# Patient Record
(demographics unavailable — no encounter records)

---

## 2024-10-09 NOTE — ASSESSMENT
[FreeTextEntry1] : Mr. CRUZ presents today for his postoperative follow up appointment; he is status post Transcatheter Aortic Valve Replacement.   Physical Exam was benign with well healing incisions. MCOT review was unremarkable.   During the visit, we discussed the importance of increasing activity and exercise as tolerated. We discussed the need for antibiotic prophylaxis with procedures such as dental work and colonoscopy or endoscopy. We recommend waiting a full six months before undergoing any dental procedure or cleaning. The patient should maintain the MCOT device as instructed for a total of 30 days.   Overall, I am pleased with Mr. CRUZ's progress postoperatively. I am recommending that he continue follow up care with Cardiology and Primary Care Provider; he will return to care in office as needed. All questions answered, patient verbalizes understanding.   Some redness was noted in his groin, he is placing interdry to both groins to help prevent.    PLAN: - Follow up Cardiology, Dr. Chappell on Friday 10/11/24 - CBC/BMP 30 Day Follow Up - Transthoracic Echocardiogram 30 Day Follow Up - EKG 30 Day Follow Up - Maintain MCOT

## 2024-10-09 NOTE — PROCEDURE
[FreeTextEntry1] : Post operative Transthoracic Echocardiogram 9/27/24  1. Left ventricular systolic function is normal with an ejection fraction of 61 % by Martinez's method of disks with an ejection fraction visually estimated at 60 to 65 %.  2. Mild left ventricular hypertrophy.  3. Analysis of left ventricular diastolic function and filling pressure is made challenging by the presence of atrial fibrillation.  4. Mildly enlarged right ventricular cavity size and normal right ventricular systolic function.  5. Estimated pulmonary artery systolic pressure is 63 mmHg, consistent with moderate-to-severe pulmonary hypertension.  6. Left atrium is severely dilated.  7. The right atrium is severely dilated.  8. Moderate to severe tricuspid regurgitation.  9. Mild to moderate mitral regurgitation. 10. CoreValve TAVR at the aortic position, minimal/trace paravalvular regugitation by spectral Doppler, mean gradient of 7 mmHg and dimensionless index of 0.45 11. No pericardial effusion seen. 12. Compared to the transthoracic echocardiogram performed on 9/6/24 increased in severity of tricuspid regurgitation and estimated PASP.

## 2024-10-09 NOTE — REASON FOR VISIT
[de-identified] : Transfemoral Valve in Valve TAVR via Right Common Femoral Artery- 29mm CoreValve Evolut FX+ [de-identified] : 9/27/2024 [de-identified] : Post op course unremarkable and patient discharged on 9/28/24.  He states that overall he is "feeling pretty good".  At times he feels a little tired but he is walking.  He doesn't feel any pain.

## 2024-10-09 NOTE — PHYSICAL EXAM
[] : no respiratory distress [Respiration, Rhythm And Depth] : normal respiratory rhythm and effort [Exaggerated Use Of Accessory Muscles For Inspiration] : no accessory muscle use [Apical Impulse] : the apical impulse was normal [Heart Sounds] : normal S1 and S2 [Irregularly Irregular] : the rhythm was irregularly irregular [Clean] : clean [Dry] : dry [Healing Well] : healing well [No Edema] : no edema [FreeTextEntry3] :  All Review of Systems Negative.

## 2024-10-09 NOTE — REASON FOR VISIT
[de-identified] : Transfemoral Valve in Valve TAVR via Right Common Femoral Artery- 29mm CoreValve Evolut FX+ [de-identified] : 9/27/2024 [de-identified] : Post op course unremarkable and patient discharged on 9/28/24.  He states that overall he is "feeling pretty good".  At times he feels a little tired but he is walking.  He doesn't feel any pain.

## 2024-10-11 NOTE — DISCUSSION/SUMMARY
[With Me] : with me [___ Month(s)] : in [unfilled] month(s) [FreeTextEntry1] :  Congestive heart failure: Improved; appears euvolemic; check labs today (metabolic panel, lipids). Continue Entresto, Lasix, Spironolactone.  S/p TAVR: Doing well s/p recent procedure; return for TTE.  Mitral insufficiency: Moderate to severe; will reevaluate valve function on repeat imaging; medical management planned at this time.  Atrial fibrillation: Permanent; continue Eliquis.

## 2024-10-11 NOTE — HISTORY OF PRESENT ILLNESS
[FreeTextEntry1] : Antwon Rowan is an 80-year-old man with a history pulmonary embolism after TKR (2018), atrial fibrillation, nonobstructive coronary artery disease, severe aortic stenosis s/p remote bioprosthetic aortic valve replacement, mitral regurgitation, hypertension, hyperlipidemia, and obesity, who returns for cardiac examination.  He has had several hospitalizations for decompensated heart failure and found to have degeneration of his prosthetic aortic valve and underwent TIMMY on 9/27/2024.  He is slowly recovering from his cardiac procedures as well as recent COVID-19 infection.  He is not experiencing dyspnea; previously seen edema has resolved.  He has no specific complaints during today's visit.  * This visit was conducted as part of ongoing, longitudinal medical care for patient's chronic medical diagnoses and other issues.

## 2024-10-11 NOTE — REASON FOR VISIT
[Cardiac Failure] : cardiac failure [Arrhythmia/ECG Abnorrmalities] : arrhythmia/ECG abnormalities [Structural Heart and Valve Disease] : structural heart and valve disease [Hypertension] : hypertension [Spouse] : spouse

## 2024-10-11 NOTE — CARDIOLOGY SUMMARY
[de-identified] : 10/11/2024. Atrial fibrillation.  Right bundle branch block with left axis -bifascicular block. [de-identified] : 8/21/2023.  Dilated left ventricle with global hypokinesia and fixed perfusion defects in the apical and mid segments of the anterior wall and in the mid and basal segments of the inferolateral wall of the left ventricle.  LVEF 43%. [de-identified] : 9/27/2024.  Left ventricular systolic function is normal with an ejection fraction of 61 %. Mild left ventricular hypertrophy. Mildly enlarged right ventricular cavity size and function. Moderate-to-severe pulmonary hypertension.  Left atrium is severely dilated. The right atrium is severely dilated. Moderate to severe tricuspid regurgitation. Mild to moderate mitral regurgitation. CoreValve TAVR at the aortic position, minimal/trace paravalvular regugitation by spectral Doppler, mean gradient of 7 mmHg and dimensionless index of 0.45 Compared to the transthoracic echocardiogram performed on 9/6/24 increased in severity of tricuspid regurgitation and estimated PASP. [de-identified] : 9/26/2024. Heavily calcified disease with mild, diffuse atherosclerosis (non-obstructive). [de-identified] : 9/27/2024. Successful TIMMY with 29 mm Evolut FX THV.

## 2024-10-11 NOTE — PHYSICAL EXAM
[No Acute Distress] : no acute distress [Clear Lung Fields] : clear lung fields [Abnormal Gait] : abnormal gait [Moves all extremities] : moves all extremities [Alert and Oriented] : alert and oriented [Good Air Entry] : good air entry [No Edema] : no edema [de-identified] : Appears stated age [de-identified] : Irregular, systolic murmur

## 2024-10-11 NOTE — REVIEW OF SYSTEMS
[Feeling Fatigued] : feeling fatigued [Weight Loss (___ Lbs)] : [unfilled] ~Ulb weight loss [SOB] : no shortness of breath [Chest Discomfort] : no chest discomfort [Lower Ext Edema] : no extremity edema

## 2025-01-29 NOTE — HISTORY OF PRESENT ILLNESS
[FreeTextEntry1] : Antwon Rowan is an 81-year-old man with a history pulmonary embolism after TKR (2018), atrial fibrillation, nonobstructive coronary artery disease, severe aortic stenosis s/p remote bioprosthetic aortic valve replacement followed by TIMMY (9/2024) due to degeneration, mitral regurgitation, hypertension, hyperlipidemia, and obesity, who returns for cardiac examination.  He reports feeling "really good."  He describes stable weight on home monitoring and the absence of edema, orthopnea, dyspnea.  * This visit was conducted as part of ongoing, longitudinal medical care for patient's chronic medical diagnoses and other issues.

## 2025-01-29 NOTE — REVIEW OF SYSTEMS
[Feeling Fatigued] : feeling fatigued [SOB] : no shortness of breath [Chest Discomfort] : no chest discomfort [Lower Ext Edema] : no extremity edema [Orthopnea] : no orthopnea [FreeTextEntry2] : Stable weight on home monitoring; weight increased on office scale [de-identified] : Tolerating anticoagulation.

## 2025-01-29 NOTE — CARDIOLOGY SUMMARY
[de-identified] : 1/29/2025. Atrial fibrillation.  Right bundle branch block with left axis -bifascicular block. [de-identified] : 8/21/2023.  Dilated left ventricle with global hypokinesia and fixed perfusion defects in the apical and mid segments of the anterior wall and in the mid and basal segments of the inferolateral wall of the left ventricle.  LVEF 43%. [de-identified] : 11/14/2024. Left ventricular systolic function is normal with ejection fraction estimated at 59 %. There is mild (grade 1) left ventricular diastolic dysfunction. Normal right ventricular size and function. Left atrium is severely dilated. The right atrium is moderately dilated. Mild to moderate mitral regurgitation. A transcatheter deployed (TAVR) is present in the aortic position. Moderate tricuspid regurgitation. Mild pulmonary hypertension. [de-identified] : 9/26/2024. Heavily calcified disease with mild, diffuse atherosclerosis (non-obstructive). [de-identified] : 9/27/2024. Successful TIMMY with 29 mm Evolut FX THV.

## 2025-01-29 NOTE — DISCUSSION/SUMMARY
[With Me] : with me [___ Month(s)] : in [unfilled] month(s) [FreeTextEntry1] :  Congestive heart failure: Stable / doing well; continue Entresto, Lasix, Spironolactone. Check metabolic panel.  S/p TAVR: Stable.  Mitral insufficiency: Severity has improved.  Atrial fibrillation: Permanent; continue Eliquis.

## 2025-01-29 NOTE — PHYSICAL EXAM
[No Acute Distress] : no acute distress [Clear Lung Fields] : clear lung fields [No Edema] : no edema [Alert and Oriented] : alert and oriented [de-identified] : Irregular, systolic murmur, normal rate [de-identified] : Warm, dry

## 2025-02-07 NOTE — ASSESSMENT
[FreeTextEntry1] : 1.CHF: Continue on Entresto, Farxiga, Lasix.  Recently taken off of spironolactone due to hyperkalemia.  Recheck potassium level per cardiology.  2.hyperlipidemia: Continue on lovastatin 40 mg once daily 2 tablets. Patient advised on low cholesterol diet-decrease in white carbs and exercise 150 minutes per week.  3.hypertension: Continue on metoprolol 25 mg once daily. Check blood pressure daily, if greater than 150/90 or less than 100/50, call MD. Keep 2 gm low sodium diet, exercise as tolerated.  4.atrial fibrillation: Continue on Eliquis 5 mg twice a day, metoprolol 25 mg once daily and follow-up with cardiology.  5.DVT/PE: Continue on Eliquis 5 mg twice daily.  Provoked per documentation.  6.precancerous skin lesions: Continue regular follow-up with dermatology.  Continue on fluconazole propionate gel and Ciclopirox olamine 0.77%.   7.bioprosthetic aortic stenosis status post TAVR: Patient is currently on Eliquis, continue regular follow-up with CT surgery.

## 2025-02-07 NOTE — HEALTH RISK ASSESSMENT
[No] : In the past 12 months have you used drugs other than those required for medical reasons? No [0] : 2) Feeling down, depressed, or hopeless: Not at all (0) [Never] : Never [With Significant Other] : lives with significant other [Retired] : retired [] :  [PHQ-2 Negative - No further assessment needed] : PHQ-2 Negative - No further assessment needed [I have developed a follow-up plan documented below in the note.] : I have developed a follow-up plan documented below in the note. [Audit-CScore] : 0 [[id number]] : 0 [EyeExamDate] : 2024 [ColonoscopyComments] : 20 years ago

## 2025-02-07 NOTE — HISTORY OF PRESENT ILLNESS
[FreeTextEntry1] : Patient comes in to establish care and annual exam. [de-identified] : JOLIE CRUZ is a 81 year M who comes in to establish care. Pt used to see , last see a year ago.  Pt with hx of HTN, HLD, knee replacement b/l, atrial fibrillation (Eliquis), DVT/PE provoked after TKR 2018, OA, BPH, Carotid artery disease, nonobstructive CAD, mitral valve regurgitation, tricuspid valve regurgitation, bioprosthetic aortic stenosis (S/p AVR 2009 with ) with severe bioprosthetic aortic stenosis 9/24 s/p TAVR ( & ), CHF, thrombocytopenia, Precancerous skin lesion.  Pt follows with , cardiology, , Derm,  hematology, Dr.Louise Tony rheumatology.  No medication refills today.  Discussed with patient and spouse regarding which medications would be filled with cardiology versus PCP. Patient spironolactone recently stopped due to hyperkalemia and will be rechecking potassium levels tomorrow with cardiology. Patient denies any cp, sob, abdominal pain, nausea, vomiting, palpitations, fever, chills, constipation, diarrhea.

## 2025-05-15 NOTE — HISTORY OF PRESENT ILLNESS
[FreeTextEntry1] : 81 year male with past medical history significant for HTN, HLD, bilateral knee replacement, atrial fibrillation (Eliquis), DVT/PE provoked after TKR 2018, OA, BPH, nonobstructive CAD, mitral valve regurgitation, tricuspid valve regurgitation, and aortic stenosis presenting at the recommendation of his podiatrist. Patient initially saw podiatry for a toe wound on his right second toe that has healed. He has purplish discoloration to his feet bilaterally so daughter became concerned about blood flow. Patient denies any claudication symptoms or ischemic rest pain. This was his first slow-healing wound. He does endorse bulging varicosities bilaterally with associated leg swelling.

## 2025-05-15 NOTE — ASSESSMENT
[FreeTextEntry1] : 81 year male referred by podiatry for venous stasis (R>L) and a healed toe wound on his right second toe. Patient has palpable distal pulses, no complaints of claudication. He has bilateral varicosities with associated skin hyperpigmentation and swelling. B/L VDX reviewed in office with patient and daughter; RLE deep venous reflux and R SSV insufficiency communicating with varicosities > 3mm.  - Compression and elevation for symptom management - Follow up as needed

## 2025-05-15 NOTE — PHYSICAL EXAM
[1+] : left 1+ [2+] : left 2+ [Ankle Swelling (On Exam)] : present [Ankle Swelling Bilaterally] : bilaterally  [Varicose Veins Of Lower Extremities] : bilaterally [Ankle Swelling On The Left] : moderate [] : bilaterally [Ankle Swelling On The Right] : mild [No Rash or Lesion] : No rash or lesion [Alert] : alert [Oriented to Person] : oriented to person [Oriented to Place] : oriented to place [Oriented to Time] : oriented to time [de-identified] : ambulating without assistance  [FreeTextEntry1] : B/L LE warm, well perfused cap refill < 2 seconds right second toe healed  Mild edema bilaterally  Hemosiderin deposition R > L

## 2025-06-03 NOTE — HISTORY OF PRESENT ILLNESS
[FreeTextEntry1] : Antwon Rowan is an 81-year-old man with a history pulmonary embolism after TKR (2018), atrial fibrillation, nonobstructive coronary artery disease, severe aortic stenosis s/p remote bioprosthetic aortic valve replacement followed by TIMMY (9/2024) due to degeneration, mitral regurgitation, hypertension, hyperlipidemia, and obesity, who returns for cardiac examination.  He feels well--no cardiac complaints; discomfort related to arthritis; occasional lightheadedness when standing up from a crouched or bent over position.  Overall, he feels like he is doing well from a cardiac standpoint.  * This visit was conducted as part of ongoing, longitudinal medical care for patient's chronic medical diagnoses and other issues.

## 2025-06-03 NOTE — PHYSICAL EXAM
[No Acute Distress] : no acute distress [Clear Lung Fields] : clear lung fields [No Edema] : no edema [Alert and Oriented] : alert and oriented [Good Air Entry] : good air entry [Abnormal Gait] : abnormal gait [de-identified] : Appears stated age [de-identified] : Irregular, systolic murmur [de-identified] : Varicose veins

## 2025-06-03 NOTE — REVIEW OF SYSTEMS
[SOB] : no shortness of breath [Chest Discomfort] : no chest discomfort [Lower Ext Edema] : no extremity edema [Orthopnea] : no orthopnea [Joint Pain] : joint pain [Dizziness] : dizziness [FreeTextEntry2] : Recent weight stable

## 2025-06-03 NOTE — CARDIOLOGY SUMMARY
[de-identified] : 6/3/2025. Atrial fibrillation.  Right bundle branch block with left axis -bifascicular block. [de-identified] : 8/21/2023.  Dilated left ventricle with global hypokinesia and fixed perfusion defects in the apical and mid segments of the anterior wall and in the mid and basal segments of the inferolateral wall of the left ventricle.  LVEF 43%. [de-identified] : 11/14/2024. Left ventricular systolic function is normal with ejection fraction estimated at 59 %. There is mild (grade 1) left ventricular diastolic dysfunction. Normal right ventricular size and function. Left atrium is severely dilated. The right atrium is moderately dilated. Mild to moderate mitral regurgitation. A transcatheter deployed (TAVR) is present in the aortic position. Moderate tricuspid regurgitation. Mild pulmonary hypertension. [de-identified] : 9/26/2024. Heavily calcified disease with mild, diffuse atherosclerosis (non-obstructive). [de-identified] : 9/27/2024. Successful TIMMY with 29 mm Evolut FX THV.

## 2025-06-03 NOTE — DISCUSSION/SUMMARY
[With Me] : with me [___ Month(s)] : in [unfilled] month(s) [FreeTextEntry1] :  Congestive heart failure: Stable; appears euvolemic; continue Entresto, Lasix, Spironolactone.   S/p TAVR: Stable. Repeat TTE at time of next visit (~ October 2025).  Mitral insufficiency: Improved; re-evaluate with TTE.  Atrial fibrillation: Permanent; continue Eliquis. He is tolerating anticoagulation.

## 2025-06-10 NOTE — HISTORY OF PRESENT ILLNESS
[FreeTextEntry1] :  Follow Up Visit  [de-identified] : JOLIE CRUZ is a 81 year M who comes in for a follow up visit. Pt with hx of HTN, HLD, knee replacement b/l, atrial fibrillation (Eliquis), DVT/PE provoked after TKR 2018, OA, BPH, Carotid artery disease, nonobstructive CAD, mitral valve regurgitation, tricuspid valve regurgitation, bioprosthetic aortic stenosis (S/p AVR 2009 with ) with severe bioprosthetic aortic stenosis 9/24 s/p TAVR ( & ), CHF, thrombocytopenia, Precancerous skin lesion.  Pt follows with , cardiology, , Derm,  hematology, Dr.Louise Tony rheumatology.  Pt did see podiatry in April and tx for right toe infection with abx and then saw Vascular surgery and no evidence of PAD.  Patient denies any cp, sob, abdominal pain, nausea, vomiting, palpitations, fever, chills, constipation, diarrhea.

## 2025-06-10 NOTE — ASSESSMENT
[FreeTextEntry1] : 1.CHF: Continue on Entresto, Farxiga, Lasix.  Recently taken off of spironolactone due to hyperkalemia.  Will have TTE done in 10/25.   2.hyperlipidemia: Continue on lovastatin 40 mg once daily 2 tablets. Patient advised on low cholesterol diet-decrease in white carbs and exercise 150 minutes per week.  3.hypertension: Continue on metoprolol 25 mg once daily. Check blood pressure daily, if greater than 150/90 or less than 100/50, call MD. Keep 2 gm low sodium diet, exercise as tolerated.  4.Toe infection: s/p abx with podiatry, obtain records, vascular sx notes reviewed.   5.arthritis: f/u with dr keita.

## 2025-06-10 NOTE — PHYSICAL EXAM
[TextEntry] : General: NAD HEENT: NC/AT, EOMI, PERRLA. CVS: S1, S2 normal, RRR, no m/g/r Resp: CTA b/l, no wheeze/rale/rhonchi Extremities: no edema, right foot 2nd toe with no swelling/erythema, chronic venous insufficiency of b/l LE.  Neuro: aaox3